# Patient Record
Sex: MALE | Race: WHITE | URBAN - NONMETROPOLITAN AREA
[De-identification: names, ages, dates, MRNs, and addresses within clinical notes are randomized per-mention and may not be internally consistent; named-entity substitution may affect disease eponyms.]

---

## 2018-11-21 ENCOUNTER — HOSPITAL ENCOUNTER (OUTPATIENT)
Age: 43
Setting detail: SPECIMEN
Discharge: HOME | End: 2018-11-21
Payer: COMMERCIAL

## 2018-11-21 ENCOUNTER — OFFICE VISIT (OUTPATIENT)
Dept: UROLOGY | Age: 43
End: 2018-11-21
Payer: COMMERCIAL

## 2018-11-21 VITALS — DIASTOLIC BLOOD PRESSURE: 84 MMHG | SYSTOLIC BLOOD PRESSURE: 134 MMHG | WEIGHT: 230 LBS

## 2018-11-21 DIAGNOSIS — N40.1 BPH WITH OBSTRUCTION/LOWER URINARY TRACT SYMPTOMS: Primary | ICD-10-CM

## 2018-11-21 DIAGNOSIS — N13.8 BPH WITH OBSTRUCTION/LOWER URINARY TRACT SYMPTOMS: ICD-10-CM

## 2018-11-21 DIAGNOSIS — Z30.09 VASECTOMY EVALUATION: ICD-10-CM

## 2018-11-21 DIAGNOSIS — N13.8 BPH WITH OBSTRUCTION/LOWER URINARY TRACT SYMPTOMS: Primary | ICD-10-CM

## 2018-11-21 DIAGNOSIS — N40.1 BPH WITH OBSTRUCTION/LOWER URINARY TRACT SYMPTOMS: ICD-10-CM

## 2018-11-21 LAB
-: ABNORMAL
AMORPHOUS: ABNORMAL
BACTERIA: ABNORMAL
BILIRUBIN URINE: NEGATIVE
CASTS UA: ABNORMAL /LPF
COLOR: YELLOW
COMMENT UA: ABNORMAL
CRYSTALS, UA: ABNORMAL /HPF
EPITHELIAL CELLS UA: ABNORMAL /HPF (ref 0–5)
GLUCOSE URINE: NEGATIVE
KETONES, URINE: NEGATIVE
LEUKOCYTE ESTERASE, URINE: NEGATIVE
MUCUS: ABNORMAL
NITRITE, URINE: NEGATIVE
OTHER OBSERVATIONS UA: ABNORMAL
PH UA: 6 (ref 5–9)
PROTEIN UA: NEGATIVE
RBC UA: ABNORMAL /HPF (ref 0–2)
RENAL EPITHELIAL, UA: ABNORMAL /HPF
SPECIFIC GRAVITY UA: 1.01 (ref 1.01–1.02)
TRICHOMONAS: ABNORMAL
TURBIDITY: CLEAR
URINE HGB: NEGATIVE
UROBILINOGEN, URINE: NORMAL
WBC UA: ABNORMAL /HPF (ref 0–5)
YEAST: ABNORMAL

## 2018-11-21 PROCEDURE — 99204 OFFICE O/P NEW MOD 45 MIN: CPT | Performed by: UROLOGY

## 2018-11-21 PROCEDURE — 87086 URINE CULTURE/COLONY COUNT: CPT

## 2018-11-21 PROCEDURE — 81001 URINALYSIS AUTO W/SCOPE: CPT

## 2018-11-21 RX ORDER — OMEPRAZOLE 40 MG/1
40 CAPSULE, DELAYED RELEASE ORAL DAILY
COMMUNITY
Start: 2018-08-20

## 2018-11-21 RX ORDER — TRAZODONE HYDROCHLORIDE 150 MG/1
150 TABLET ORAL NIGHTLY
COMMUNITY
Start: 2018-11-13

## 2018-11-21 RX ORDER — UBIDECARENONE 75 MG
50 CAPSULE ORAL DAILY
COMMUNITY

## 2018-11-21 RX ORDER — MONTELUKAST SODIUM 10 MG/1
10 TABLET ORAL NIGHTLY
COMMUNITY
Start: 2018-11-13

## 2018-11-21 RX ORDER — M-VIT,TX,IRON,MINS/CALC/FOLIC 27MG-0.4MG
1 TABLET ORAL DAILY
COMMUNITY

## 2018-11-21 RX ORDER — TADALAFIL 5 MG/1
5 TABLET ORAL DAILY
Qty: 30 TABLET | Refills: 11 | Status: SHIPPED | OUTPATIENT
Start: 2018-11-21 | End: 2019-07-29 | Stop reason: SDUPTHER

## 2018-11-21 RX ORDER — ALPRAZOLAM 0.5 MG/1
0.5 TABLET ORAL DAILY
COMMUNITY
Start: 2018-11-14 | End: 2019-11-14

## 2018-11-21 ASSESSMENT — ENCOUNTER SYMPTOMS
SHORTNESS OF BREATH: 0
COLOR CHANGE: 0
EYE PAIN: 0
EYE REDNESS: 0
NAUSEA: 0
BACK PAIN: 0
COUGH: 0
ABDOMINAL PAIN: 0
VOMITING: 0
WHEEZING: 0

## 2018-11-21 NOTE — PROGRESS NOTES
Subjective:      Patient ID: Amie Jolly is a 37 y.o. male. HPI  Patient is a 37 y.o. male in no acute distress and alert and oriented to person, place and time. Patient is here today to discuss vasectomy. Patient here today for vasectomy consultation. We did begin the conversation today with a discussion on the permanence of the procedure. We did discuss the cost and efficacy of vasectomy reversal.  We also discussed a waiting period of 6-8 weeks after the procedure. We advise to not have sexual intercourse during this waiting period. We also discussed the importance of a postvasectomy semen analysis. Patient is also having issues with inability to void. He is having double voiding issues and a hard time emptying his bladder. He reports no pain today. No past medical history on file. No past surgical history on file. Outpatient Encounter Prescriptions as of 11/21/2018   Medication Sig Dispense Refill    omeprazole (PRILOSEC) 40 MG delayed release capsule       Cetirizine HCl (ZYRTEC ALLERGY) 10 MG CAPS       ALPRAZolam (XANAX) 0.5 MG tablet Take 0.5 mg by mouth. .      montelukast (SINGULAIR) 10 MG tablet       traZODone (DESYREL) 150 MG tablet        No facility-administered encounter medications on file as of 11/21/2018. No current outpatient prescriptions on file prior to visit. No current facility-administered medications on file prior to visit. Metoclopramide and Prochlorperazine  No family history on file. History   Smoking Status    Not on file   Smokeless Tobacco    Not on file        History   Alcohol use Not on file       There were no vitals filed for this visit. Constitutional: Patient in no acute distress; Neuro: alert and oriented to person place and time.     Psych: Mood and affect normal.  Skin: Normal  Lungs: Respiratory effort normal  Cardiovascular:  Normal peripheral pulses  Abdomen: Soft, non-tender, non-distended with no CVA, flank pain,

## 2018-11-22 LAB
CULTURE: NO GROWTH
Lab: NORMAL
SPECIMEN DESCRIPTION: NORMAL
STATUS: NORMAL

## 2018-11-26 ENCOUNTER — TELEPHONE (OUTPATIENT)
Dept: UROLOGY | Age: 43
End: 2018-11-26

## 2018-12-28 ENCOUNTER — OFFICE VISIT (OUTPATIENT)
Dept: UROLOGY | Age: 43
End: 2018-12-28
Payer: COMMERCIAL

## 2018-12-28 VITALS
HEIGHT: 77 IN | BODY MASS INDEX: 26.68 KG/M2 | SYSTOLIC BLOOD PRESSURE: 130 MMHG | WEIGHT: 226 LBS | DIASTOLIC BLOOD PRESSURE: 78 MMHG

## 2018-12-28 DIAGNOSIS — R39.13 INTERMITTENT URINARY STREAM: ICD-10-CM

## 2018-12-28 DIAGNOSIS — R39.12 WEAK URINARY STREAM: ICD-10-CM

## 2018-12-28 DIAGNOSIS — Z30.09 VASECTOMY EVALUATION: Primary | ICD-10-CM

## 2018-12-28 DIAGNOSIS — N40.1 BPH WITH OBSTRUCTION/LOWER URINARY TRACT SYMPTOMS: ICD-10-CM

## 2018-12-28 DIAGNOSIS — N13.8 BPH WITH OBSTRUCTION/LOWER URINARY TRACT SYMPTOMS: ICD-10-CM

## 2018-12-28 PROCEDURE — 99213 OFFICE O/P EST LOW 20 MIN: CPT | Performed by: NURSE PRACTITIONER

## 2018-12-28 NOTE — PROGRESS NOTES
History  12/14/2018 vasectomy    Today  Here today for a 2 week follow-up status post vastectomy on 12/14/2018. Pathology does show bilateral complete transverse sections of the vas deferens. Incisions are well approximated without erythema or drainage. There is no testicular pain or swelling. At his last visit we also start him on daily Cialis for symptoms of BPH. He denies a weak stream, hesitancy, intermittency, frequency, urgency, or nocturia. He is very happy with the results of the Cialis. Plan  Patient understands that he is not considered infertile until sperm-free semenalysis 2 months after procedure. We will call him with his semen analysis results. We will have him back in 6 months to reevaluate his symptoms of BPH, but he will call sooner if needed for new or worsening symptoms. If his urinary symptoms remain stable we will remove his follow-up out to annually.

## 2018-12-31 PROBLEM — R39.12 WEAK URINARY STREAM: Status: ACTIVE | Noted: 2018-12-31

## 2018-12-31 PROBLEM — R39.13 INTERMITTENT URINARY STREAM: Status: ACTIVE | Noted: 2018-12-31

## 2018-12-31 PROBLEM — N40.1 BPH WITH OBSTRUCTION/LOWER URINARY TRACT SYMPTOMS: Status: ACTIVE | Noted: 2018-12-31

## 2018-12-31 PROBLEM — N13.8 BPH WITH OBSTRUCTION/LOWER URINARY TRACT SYMPTOMS: Status: ACTIVE | Noted: 2018-12-31

## 2019-02-22 ENCOUNTER — TELEPHONE (OUTPATIENT)
Dept: UROLOGY | Age: 44
End: 2019-02-22

## 2019-02-25 ENCOUNTER — TELEPHONE (OUTPATIENT)
Dept: UROLOGY | Age: 44
End: 2019-02-25

## 2019-03-06 ENCOUNTER — TELEPHONE (OUTPATIENT)
Dept: UROLOGY | Age: 44
End: 2019-03-06

## 2019-07-29 ENCOUNTER — OFFICE VISIT (OUTPATIENT)
Dept: UROLOGY | Age: 44
End: 2019-07-29
Payer: COMMERCIAL

## 2019-07-29 VITALS
BODY MASS INDEX: 27.87 KG/M2 | HEIGHT: 77 IN | SYSTOLIC BLOOD PRESSURE: 140 MMHG | WEIGHT: 236 LBS | DIASTOLIC BLOOD PRESSURE: 90 MMHG

## 2019-07-29 DIAGNOSIS — N13.8 BPH WITH OBSTRUCTION/LOWER URINARY TRACT SYMPTOMS: Primary | ICD-10-CM

## 2019-07-29 DIAGNOSIS — N40.1 BPH WITH OBSTRUCTION/LOWER URINARY TRACT SYMPTOMS: Primary | ICD-10-CM

## 2019-07-29 PROCEDURE — 99213 OFFICE O/P EST LOW 20 MIN: CPT | Performed by: UROLOGY

## 2019-07-29 RX ORDER — TADALAFIL 5 MG/1
5 TABLET ORAL DAILY
Qty: 30 TABLET | Refills: 11 | Status: SHIPPED | OUTPATIENT
Start: 2019-07-29 | End: 2020-07-29 | Stop reason: SDUPTHER

## 2019-07-29 ASSESSMENT — ENCOUNTER SYMPTOMS
COLOR CHANGE: 0
WHEEZING: 0
ABDOMINAL PAIN: 0
VOMITING: 0
EYE REDNESS: 0
BACK PAIN: 0
NAUSEA: 0
EYE PAIN: 0
COUGH: 0
SHORTNESS OF BREATH: 0

## 2020-07-29 ENCOUNTER — OFFICE VISIT (OUTPATIENT)
Dept: UROLOGY | Age: 45
End: 2020-07-29
Payer: COMMERCIAL

## 2020-07-29 VITALS
BODY MASS INDEX: 28.57 KG/M2 | DIASTOLIC BLOOD PRESSURE: 82 MMHG | TEMPERATURE: 98.7 F | WEIGHT: 242 LBS | SYSTOLIC BLOOD PRESSURE: 132 MMHG | HEIGHT: 77 IN

## 2020-07-29 PROCEDURE — 99214 OFFICE O/P EST MOD 30 MIN: CPT | Performed by: UROLOGY

## 2020-07-29 PROCEDURE — 51798 US URINE CAPACITY MEASURE: CPT | Performed by: UROLOGY

## 2020-07-29 RX ORDER — SILDENAFIL CITRATE 20 MG/1
100 TABLET ORAL DAILY PRN
Qty: 40 TABLET | Refills: 5 | Status: SHIPPED | OUTPATIENT
Start: 2020-07-29 | End: 2021-07-28

## 2020-07-29 RX ORDER — ALPRAZOLAM 0.5 MG/1
0.5 TABLET ORAL 3 TIMES DAILY PRN
COMMUNITY
Start: 2020-05-18 | End: 2021-05-18

## 2020-07-29 RX ORDER — NAPROXEN 500 MG/1
TABLET ORAL
COMMUNITY
Start: 2020-06-04 | End: 2022-07-27

## 2020-07-29 RX ORDER — SILDENAFIL CITRATE 20 MG/1
100 TABLET ORAL DAILY PRN
Qty: 40 TABLET | Refills: 5 | Status: SHIPPED | OUTPATIENT
Start: 2020-07-29 | End: 2020-07-29

## 2020-07-29 RX ORDER — MELOXICAM 15 MG/1
TABLET ORAL
COMMUNITY
Start: 2020-07-13

## 2020-07-29 RX ORDER — TADALAFIL 5 MG/1
5 TABLET ORAL DAILY
Qty: 90 TABLET | Refills: 3 | Status: SHIPPED | OUTPATIENT
Start: 2020-07-29 | End: 2021-06-07 | Stop reason: SDUPTHER

## 2020-07-29 RX ORDER — BACLOFEN 20 MG/1
TABLET ORAL
COMMUNITY
Start: 2020-06-30

## 2020-07-29 RX ORDER — HYDROCODONE BITARTRATE AND ACETAMINOPHEN 5; 325 MG/1; MG/1
TABLET ORAL
COMMUNITY
Start: 2020-06-15 | End: 2022-07-27

## 2020-07-29 ASSESSMENT — ENCOUNTER SYMPTOMS
NAUSEA: 0
EYE PAIN: 0
WHEEZING: 0
COLOR CHANGE: 0
ABDOMINAL PAIN: 0
SHORTNESS OF BREATH: 0
VOMITING: 0
BACK PAIN: 0
EYE REDNESS: 0
COUGH: 0

## 2020-07-29 NOTE — PATIENT INSTRUCTIONS
SURVEY:    You may be receiving a survey from [employer] regarding your visit today. Please complete the survey to enable us to provide the highest quality of care to you and your family. If you cannot score us a very good on any question, please call the office to discuss how we could of made your experience a very good one. Thank you.

## 2020-07-29 NOTE — PROGRESS NOTES
HPI:    Patient is a 50 y.o. male in no acute distress. He is alert and oriented to person, place, and time. History  11/2018 Started daily cialis for double voiding and straining with urination    12/2018 vasectomy    No past medical history on file. Past Surgical History:   Procedure Laterality Date    APPENDECTOMY      BACK SURGERY      x 2    HERNIA REPAIR      SHOULDER SURGERY Left     SINUS SURGERY      x 2    VASECTOMY  12/18/2018    [facility] [id number] EXTRACTION       Outpatient Encounter Medications as of 7/29/2020   Medication Sig Dispense Refill    tadalafil (CIALIS) 5 MG tablet Take 1 tablet by mouth daily 30 tablet 11    omeprazole (PRILOSEC) 40 MG delayed release capsule Take 40 mg by mouth daily       Cetirizine HCl (ZYRTEC ALLERGY) 10 MG CAPS Take 10 mg by mouth daily       montelukast (SINGULAIR) 10 MG tablet Take 10 mg by mouth nightly       traZODone (DESYREL) 150 MG tablet Take 150 mg by mouth nightly       Multiple Vitamins-Minerals (THERAPEUTIC MULTIVITAMIN-MINERALS) tablet Take 1 tablet by mouth daily      vitamin B-12 (CYANOCOBALAMIN) 100 MCG tablet Take 50 mcg by mouth daily      Vilazodone HCl (VIIBRYD STARTER PACK) 10 & 20 MG KIT Take 10 mg by mouth daily        No facility-administered encounter medications on file as of 7/29/2020.        Current Outpatient Medications on File Prior to Visit   Medication Sig Dispense Refill    tadalafil (CIALIS) 5 MG tablet Take 1 tablet by mouth daily 30 tablet 11    omeprazole (PRILOSEC) 40 MG delayed release capsule Take 40 mg by mouth daily       Cetirizine HCl (ZYRTEC ALLERGY) 10 MG CAPS Take 10 mg by mouth daily       montelukast (SINGULAIR) 10 MG tablet Take 10 mg by mouth nightly       traZODone (DESYREL) 150 MG tablet Take 150 mg by mouth nightly       Multiple Vitamins-Minerals (THERAPEUTIC MULTIVITAMIN-MINERALS) tablet Take 1 tablet by mouth daily      vitamin B-12 (CYANOCOBALAMIN) 100 MCG tablet Take 50 mcg by mouth daily      Vilazodone HCl (VIIBRYD STARTER PACK) 10 & 20 MG KIT Take 10 mg by mouth daily        No current facility-administered medications on file prior to visit. Prochlorperazine and Metoclopramide  Family History   Problem Relation Age of Onset    Diabetes Father      Social History     Tobacco Use   Smoking Status Former Smoker    Packs/day: 0.25    Years: 0.50    Pack years: 0.12    Types: Cigarettes    Last attempt to quit: 2019    Years since quittin.0   Smokeless Tobacco Never Used       Social History     Substance and Sexual Activity   Alcohol Use Yes    Comment: social       Review of Systems    Temp 98.7 °F (37.1 °C) (Temporal)   Ht 6' 5\" (1.956 m)   Wt 242 lb (109.8 kg)   BMI 28.70 kg/m²       PHYSICAL EXAM:  Constitutional: Patient in no acute distress; Neuro: alert and oriented to person place and time. Psych: Mood and affect normal.  Skin: Normal  Lungs: Respiratory effort normal  Cardiovascular:  Normal peripheral pulses  Abdomen: Soft, non-tender, non-distended with no CVA, flank pain, hepatosplenomegaly or hernia. Kidneys normal.  Bladder non-tender and not distended. Lymphatics: no palpable lymphadenopathy  Penis normal  Urethral meatus normal  Scrotal exam normal  Testicles normal bilaterally  Epididymis normal bilaterally  No evidence of inguinal hernia  Anus and perineum normal  Normal rectal tone with no masses  Prostate soft, non-tender to palpation. No palpable nodules. Estimated 40 grams. Seminal vesicles not palpable.        No results found for: BUN  No results found for: CREATININE  No results found for: PSA    ASSESSMENT:  ***      PLAN:  ***

## 2020-07-29 NOTE — PROGRESS NOTES
HPI:    Patient is a 50 y.o. male in no acute distress. He is alert and oriented to person, place, and time. History  11/2018 Started daily cialis for double voiding and straining with urination     12/2018 vasectomy    Today  Here today for a one-year follow-up for BPH. He is taking daily Cialis. Despite this he does feel he is worsening frequency and urgency. Patient does continue to take daily Cialis. This helps him with his urgency and frequency. He does use Viagra for his erections. He has been using an online service to get his viagra. He does feel that they are more expensive. He has had no recent gross hematuria dysuria. He is able to achieve erections with 100 mg. Patient has no pain today. No past medical history on file. Past Surgical History:   Procedure Laterality Date    APPENDECTOMY      BACK SURGERY      x 2    HERNIA REPAIR      SHOULDER SURGERY Left     SINUS SURGERY      x 2    VASECTOMY  12/18/2018    [facility] - Dr. Muna Zhang    WISDOM TOOTH EXTRACTION       Outpatient Encounter Medications as of 7/29/2020   Medication Sig Dispense Refill    ALPRAZolam (XANAX) 0.5 MG tablet Take 0.5 mg by mouth 3 times daily as needed.       baclofen (LIORESAL) 20 MG tablet       HYDROcodone-acetaminophen (NORCO) 5-325 MG per tablet       meloxicam (MOBIC) 15 MG tablet       naproxen (NAPROSYN) 500 MG tablet       tadalafil (CIALIS) 5 MG tablet Take 1 tablet by mouth daily 30 tablet 11    omeprazole (PRILOSEC) 40 MG delayed release capsule Take 40 mg by mouth daily       Cetirizine HCl (ZYRTEC ALLERGY) 10 MG CAPS Take 10 mg by mouth daily       montelukast (SINGULAIR) 10 MG tablet Take 10 mg by mouth nightly       traZODone (DESYREL) 150 MG tablet Take 150 mg by mouth nightly       Multiple Vitamins-Minerals (THERAPEUTIC MULTIVITAMIN-MINERALS) tablet Take 1 tablet by mouth daily      vitamin B-12 (CYANOCOBALAMIN) 100 MCG tablet Take 50 mcg by mouth daily      Vilazodone HCl (VIIBRYD STARTER PACK) 10 & 20 MG KIT Take 10 mg by mouth daily        No facility-administered encounter medications on file as of 2020. Current Outpatient Medications on File Prior to Visit   Medication Sig Dispense Refill    ALPRAZolam (XANAX) 0.5 MG tablet Take 0.5 mg by mouth 3 times daily as needed.  baclofen (LIORESAL) 20 MG tablet       HYDROcodone-acetaminophen (NORCO) 5-325 MG per tablet       meloxicam (MOBIC) 15 MG tablet       naproxen (NAPROSYN) 500 MG tablet       tadalafil (CIALIS) 5 MG tablet Take 1 tablet by mouth daily 30 tablet 11    omeprazole (PRILOSEC) 40 MG delayed release capsule Take 40 mg by mouth daily       Cetirizine HCl (ZYRTEC ALLERGY) 10 MG CAPS Take 10 mg by mouth daily       montelukast (SINGULAIR) 10 MG tablet Take 10 mg by mouth nightly       traZODone (DESYREL) 150 MG tablet Take 150 mg by mouth nightly       Multiple Vitamins-Minerals (THERAPEUTIC MULTIVITAMIN-MINERALS) tablet Take 1 tablet by mouth daily      vitamin B-12 (CYANOCOBALAMIN) 100 MCG tablet Take 50 mcg by mouth daily      Vilazodone HCl (VIIBRYD STARTER PACK) 10 & 20 MG KIT Take 10 mg by mouth daily        No current facility-administered medications on file prior to visit. Prochlorperazine and Metoclopramide  Family History   Problem Relation Age of Onset    Diabetes Father      Social History     Tobacco Use   Smoking Status Former Smoker    Packs/day: 0.25    Years: 0.50    Pack years: 0.12    Types: Cigarettes    Last attempt to quit: 2019    Years since quittin.0   Smokeless Tobacco Never Used       Social History     Substance and Sexual Activity   Alcohol Use Yes    Comment: social       Review of Systems   Constitutional: Negative for appetite change, chills and fever. Eyes: Negative for pain, redness and visual disturbance. Respiratory: Negative for cough, shortness of breath and wheezing.     Cardiovascular: Negative for chest pain and leg swelling. Gastrointestinal: Negative for abdominal pain, nausea and vomiting. Genitourinary: Negative for difficulty urinating, discharge, dysuria, flank pain, frequency, hematuria, scrotal swelling and testicular pain. Musculoskeletal: Negative for back pain, joint swelling and myalgias. Skin: Negative for color change, rash and wound. Neurological: Negative for dizziness, tremors and numbness. Hematological: Negative for adenopathy. Does not bruise/bleed easily. /82 (Site: Right Upper Arm, Position: Sitting, Cuff Size: Large Adult)   Temp 98.7 °F (37.1 °C) (Temporal)   Ht 6' 5\" (1.956 m)   Wt 242 lb (109.8 kg)   BMI 28.70 kg/m²       PHYSICAL EXAM:  Constitutional: Patient in no acute distress; Neuro: alert and oriented to person place and time. Psych: Mood and affect normal.  Skin: Normal  Lungs: Respiratory effort normal  Cardiovascular:  Normal peripheral pulses  Abdomen: Soft, non-tender, non-distended with no CVA, flank pain, hepatosplenomegaly or hernia. Kidneys normal.  Bladder non-tender and not distended. Lymphatics: no palpable lymphadenopathy  Penis normal  Urethral meatus normal  Scrotal exam normal  Testicles normal bilaterally  Epididymis normal bilaterally  No evidence of inguinal hernia    No results found for: BUN  No results found for: CREATININE  No results found for: PSA    ASSESSMENT:  This is a 50 y.o. male with the following diagnoses:   Diagnosis Orders   1. Frequency of urination  MO MEASUREMENT,POST-VOID RESIDUAL VOLUME BY US,NON-IMAGING   2. BPH with obstruction/lower urinary tract symptoms  MO MEASUREMENT,POST-VOID RESIDUAL VOLUME BY US,NON-IMAGING    tadalafil (CIALIS) 5 MG tablet   3. Urgency of urination  MO MEASUREMENT,POST-VOID RESIDUAL VOLUME BY US,NON-IMAGING   4. Erectile dysfunction, unspecified erectile dysfunction type  sildenafil (REVATIO) 20 MG tablet         PLAN:  We did prescribe patient to [facility] today. He will keep his daily Cialis. We will see him back in 1 year. We did send both of his prescription sent to mail order. He will call us back if these are unaffordable.

## 2021-06-07 ENCOUNTER — TELEPHONE (OUTPATIENT)
Dept: UROLOGY | Age: 46
End: 2021-06-07

## 2021-06-07 DIAGNOSIS — N40.1 BPH WITH OBSTRUCTION/LOWER URINARY TRACT SYMPTOMS: ICD-10-CM

## 2021-06-07 DIAGNOSIS — N13.8 BPH WITH OBSTRUCTION/LOWER URINARY TRACT SYMPTOMS: ICD-10-CM

## 2021-06-07 RX ORDER — TADALAFIL 5 MG/1
5 TABLET ORAL DAILY
Qty: 90 TABLET | Refills: 0 | Status: SHIPPED | OUTPATIENT
Start: 2021-06-07 | End: 2021-07-28 | Stop reason: SDUPTHER

## 2021-06-07 NOTE — TELEPHONE ENCOUNTER
Patient needs a refill of Cialis sent to mail order. He said he does not have enough until his appointment on 7/28.

## 2021-06-07 NOTE — TELEPHONE ENCOUNTER
Patient's chart was reviewed. Patient tolerates Cialis well.   We will give him 1 refill and have him follow-up as scheduled

## 2021-07-28 ENCOUNTER — OFFICE VISIT (OUTPATIENT)
Dept: UROLOGY | Age: 46
End: 2021-07-28
Payer: COMMERCIAL

## 2021-07-28 VITALS
WEIGHT: 226 LBS | DIASTOLIC BLOOD PRESSURE: 81 MMHG | SYSTOLIC BLOOD PRESSURE: 135 MMHG | HEIGHT: 77 IN | HEART RATE: 90 BPM | TEMPERATURE: 99.5 F | BODY MASS INDEX: 26.68 KG/M2

## 2021-07-28 DIAGNOSIS — R39.15 URGENCY OF URINATION: ICD-10-CM

## 2021-07-28 DIAGNOSIS — N52.9 ERECTILE DYSFUNCTION, UNSPECIFIED ERECTILE DYSFUNCTION TYPE: ICD-10-CM

## 2021-07-28 DIAGNOSIS — R35.0 FREQUENCY OF URINATION: Primary | ICD-10-CM

## 2021-07-28 DIAGNOSIS — N40.1 BPH WITH OBSTRUCTION/LOWER URINARY TRACT SYMPTOMS: ICD-10-CM

## 2021-07-28 DIAGNOSIS — N13.8 BPH WITH OBSTRUCTION/LOWER URINARY TRACT SYMPTOMS: ICD-10-CM

## 2021-07-28 PROCEDURE — 99213 OFFICE O/P EST LOW 20 MIN: CPT | Performed by: PHYSICIAN ASSISTANT

## 2021-07-28 RX ORDER — SILDENAFIL 100 MG/1
100 TABLET, FILM COATED ORAL DAILY PRN
Qty: 30 TABLET | Refills: 2 | Status: SHIPPED | OUTPATIENT
Start: 2021-07-28 | End: 2022-07-27

## 2021-07-28 RX ORDER — SILDENAFIL CITRATE 20 MG/1
100 TABLET ORAL DAILY PRN
Qty: 40 TABLET | Refills: 5 | Status: CANCELLED | OUTPATIENT
Start: 2021-07-28

## 2021-07-28 RX ORDER — ALPRAZOLAM 0.5 MG/1
0.5 TABLET ORAL 3 TIMES DAILY PRN
COMMUNITY
Start: 2021-06-23 | End: 2022-06-23

## 2021-07-28 RX ORDER — TIZANIDINE 4 MG/1
TABLET ORAL
COMMUNITY
Start: 2021-07-12

## 2021-07-28 RX ORDER — TADALAFIL 5 MG/1
5 TABLET ORAL DAILY
Qty: 90 TABLET | Refills: 3 | Status: SHIPPED | OUTPATIENT
Start: 2021-07-28 | End: 2022-05-23

## 2021-07-28 RX ORDER — VALACYCLOVIR HYDROCHLORIDE 500 MG/1
TABLET, FILM COATED ORAL
COMMUNITY
Start: 2021-07-20

## 2021-07-28 ASSESSMENT — ENCOUNTER SYMPTOMS
ABDOMINAL PAIN: 0
CONSTIPATION: 0
NAUSEA: 0
VOMITING: 0
BACK PAIN: 0
WHEEZING: 0
SHORTNESS OF BREATH: 0
EYE REDNESS: 0
COLOR CHANGE: 0
COUGH: 0

## 2021-07-28 NOTE — PATIENT INSTRUCTIONS
SURVEY:    You may be receiving a survey from [employer] regarding your visit today. Please complete the survey to enable us to provide the highest quality of care to you and your family. If you cannot score us a very good on any question, please call the office to discuss how we could of made your experience a very good one. Thank you. Schedule a Vaccine  When you qualify to receive the vaccine per the [address] guidelines, call the [facility]) COVID-19 Vaccination Hotline to schedule your appointment or to get additional information about the [facility]) locations which are offering the COVID-19 vaccine. To be most effective, it's important that you receive two doses of one of the COVID-19 vaccines. -If you are receiving the [place] [facility] vaccine, your second shot will be scheduled as close to 21 days after the first shot as possible. -If you are receiving the Moderna vaccine, your second shot will be scheduled as close to 28 days after the first shot as possible. [address] Vaccination Hotline: [phone]    In partnership with [facility] and [facility] HEALTH Departments, patients can call [phone], Monday-Friday 8:00am-4:00pm for scheduling at our Hospitals. Or visit the [facility] websites for additional information of vaccine administration locations. Links to [facility]) website and Health Department websites:    [email or web address]-[email or web address]    [email or web address]    [email or web address]

## 2021-07-28 NOTE — PROGRESS NOTES
HPI:      Patient is a 48 y.o. male in no acute distress. He is alert and oriented to person, place, and time. History  11/2018 Started daily cialis for double voiding and straining with urination     12/2018 vasectomy    Today  Patient is here today for a one-year follow-up for BPH. He is taking daily Cialis, as this does help him with his urgency and frequency. He uses situational sildenafil for erectile dysfunction. He is able to achieve and maintain an erection with 100 mg of sildenafil. He denies any erections lasting more than 4 hours. He denies any fever, chills, gross hematuria, flank pain, dysuria. He denies any chest pain shortness of breath or dyspnea on exertion. He overall is happy. No past medical history on file.   Past Surgical History:   Procedure Laterality Date    APPENDECTOMY      BACK SURGERY      x 2    HERNIA REPAIR      SHOULDER SURGERY Left     SINUS SURGERY      x 2    VASECTOMY  12/18/2018    [facility] - Dr. Ronnie Tao WISDOM TOOTH EXTRACTION       Outpatient Encounter Medications as of 7/28/2021   Medication Sig Dispense Refill    tadalafil (CIALIS) 5 MG tablet Take 1 tablet by mouth daily 90 tablet 0    baclofen (LIORESAL) 20 MG tablet       HYDROcodone-acetaminophen (NORCO) 5-325 MG per tablet       meloxicam (MOBIC) 15 MG tablet       naproxen (NAPROSYN) 500 MG tablet       sildenafil (REVATIO) 20 MG tablet Take 5 tablets by mouth daily as needed (erectile dysfunction) 40 tablet 5    Vilazodone HCl (VIIBRYD STARTER PACK) 10 & 20 MG KIT Take 10 mg by mouth daily       omeprazole (PRILOSEC) 40 MG delayed release capsule Take 40 mg by mouth daily       Cetirizine HCl (ZYRTEC ALLERGY) 10 MG CAPS Take 10 mg by mouth daily       montelukast (SINGULAIR) 10 MG tablet Take 10 mg by mouth nightly       traZODone (DESYREL) 150 MG tablet Take 150 mg by mouth nightly       Multiple Vitamins-Minerals (THERAPEUTIC MULTIVITAMIN-MINERALS) tablet Take 1 tablet by mouth daily      vitamin B-12 (CYANOCOBALAMIN) 100 MCG tablet Take 50 mcg by mouth daily       No facility-administered encounter medications on file as of 2021. Current Outpatient Medications on File Prior to Visit   Medication Sig Dispense Refill    tadalafil (CIALIS) 5 MG tablet Take 1 tablet by mouth daily 90 tablet 0    baclofen (LIORESAL) 20 MG tablet       HYDROcodone-acetaminophen (NORCO) 5-325 MG per tablet       meloxicam (MOBIC) 15 MG tablet       naproxen (NAPROSYN) 500 MG tablet       sildenafil (REVATIO) 20 MG tablet Take 5 tablets by mouth daily as needed (erectile dysfunction) 40 tablet 5    Vilazodone HCl (VIIBRYD STARTER PACK) 10 & 20 MG KIT Take 10 mg by mouth daily       omeprazole (PRILOSEC) 40 MG delayed release capsule Take 40 mg by mouth daily       Cetirizine HCl (ZYRTEC ALLERGY) 10 MG CAPS Take 10 mg by mouth daily       montelukast (SINGULAIR) 10 MG tablet Take 10 mg by mouth nightly       traZODone (DESYREL) 150 MG tablet Take 150 mg by mouth nightly       Multiple Vitamins-Minerals (THERAPEUTIC MULTIVITAMIN-MINERALS) tablet Take 1 tablet by mouth daily      vitamin B-12 (CYANOCOBALAMIN) 100 MCG tablet Take 50 mcg by mouth daily       No current facility-administered medications on file prior to visit. Prochlorperazine and Metoclopramide  Family History   Problem Relation Age of Onset    Diabetes Father      Social History     Tobacco Use   Smoking Status Former Smoker    Packs/day: 0.25    Years: 0.50    Pack years: 0.12    Types: Cigarettes    Quit date: 2019    Years since quittin.0   Smokeless Tobacco Never Used       Social History     Substance and Sexual Activity   Alcohol Use Yes    Comment: social       Review of Systems   Constitutional: Negative for appetite change, chills and fever. Eyes: Negative for redness and visual disturbance. Respiratory: Negative for cough, shortness of breath and wheezing.     Cardiovascular: Negative for chest pain and leg swelling. Gastrointestinal: Negative for abdominal pain, constipation, nausea and vomiting. Genitourinary: Negative for decreased urine volume, difficulty urinating, discharge, dysuria, enuresis, flank pain, frequency, hematuria, penile pain, scrotal swelling, testicular pain and urgency. Musculoskeletal: Negative for back pain, joint swelling and myalgias. Skin: Negative for color change, rash and wound. Neurological: Negative for dizziness, tremors and numbness. Hematological: Negative for adenopathy. Does not bruise/bleed easily. Temp 99.5 °F (37.5 °C) (Temporal)   Ht 6' 5\" (1.956 m)   Wt 226 lb (102.5 kg)   BMI 26.80 kg/m²       PHYSICAL EXAM:  Constitutional: Patient in no acute distress; Neuro: alert and oriented to person place and time. Psych: Mood and affect normal.  Lungs: Respiratory effort normal  Abdomen: Soft, non-tender, non-distended  Rectal: Deferred      No results found for: BUN  No results found for: CREATININE  No results found for: PSA    ASSESSMENT:   Diagnosis Orders   1. Frequency of urination  tadalafil (CIALIS) 5 MG tablet   2. BPH with obstruction/lower urinary tract symptoms  tadalafil (CIALIS) 5 MG tablet   3. Erectile dysfunction, unspecified erectile dysfunction type     4. Urgency of urination  tadalafil (CIALIS) 5 MG tablet         PLAN:  Continue sildenafil 100 mg as needed for erectile dysfunction    Continue daily tadalafil 5 mg for his urgency and frequency symptoms. Follow-up in 1 year    Patient is aware if he develops an erection lasting more than 4 hours he needs go the emergency room.

## 2022-05-22 DIAGNOSIS — R39.15 URGENCY OF URINATION: ICD-10-CM

## 2022-05-22 DIAGNOSIS — N13.8 BPH WITH OBSTRUCTION/LOWER URINARY TRACT SYMPTOMS: ICD-10-CM

## 2022-05-22 DIAGNOSIS — R35.0 FREQUENCY OF URINATION: ICD-10-CM

## 2022-05-22 DIAGNOSIS — N40.1 BPH WITH OBSTRUCTION/LOWER URINARY TRACT SYMPTOMS: ICD-10-CM

## 2022-05-23 RX ORDER — TADALAFIL 5 MG/1
5 TABLET ORAL DAILY
Qty: 90 TABLET | Refills: 0 | Status: SHIPPED | OUTPATIENT
Start: 2022-05-23 | End: 2022-07-27 | Stop reason: SDUPTHER

## 2022-07-27 ENCOUNTER — OFFICE VISIT (OUTPATIENT)
Dept: UROLOGY | Age: 47
End: 2022-07-27
Payer: COMMERCIAL

## 2022-07-27 VITALS
WEIGHT: 242 LBS | SYSTOLIC BLOOD PRESSURE: 144 MMHG | HEART RATE: 99 BPM | DIASTOLIC BLOOD PRESSURE: 91 MMHG | BODY MASS INDEX: 28.7 KG/M2

## 2022-07-27 DIAGNOSIS — R39.15 URGENCY OF URINATION: ICD-10-CM

## 2022-07-27 DIAGNOSIS — R35.0 FREQUENCY OF URINATION: ICD-10-CM

## 2022-07-27 DIAGNOSIS — N13.8 BPH WITH OBSTRUCTION/LOWER URINARY TRACT SYMPTOMS: ICD-10-CM

## 2022-07-27 DIAGNOSIS — N52.9 ERECTILE DYSFUNCTION, UNSPECIFIED ERECTILE DYSFUNCTION TYPE: Primary | ICD-10-CM

## 2022-07-27 DIAGNOSIS — N40.1 BPH WITH OBSTRUCTION/LOWER URINARY TRACT SYMPTOMS: ICD-10-CM

## 2022-07-27 PROCEDURE — 99214 OFFICE O/P EST MOD 30 MIN: CPT | Performed by: PHYSICIAN ASSISTANT

## 2022-07-27 RX ORDER — TADALAFIL 20 MG/1
20 TABLET ORAL DAILY PRN
Qty: 30 TABLET | Refills: 2 | Status: SHIPPED | OUTPATIENT
Start: 2022-07-27

## 2022-07-27 RX ORDER — OXYCODONE HYDROCHLORIDE 5 MG/1
TABLET ORAL
COMMUNITY
Start: 2022-07-20

## 2022-07-27 RX ORDER — ALPRAZOLAM 0.5 MG/1
TABLET ORAL
COMMUNITY
Start: 2022-07-13

## 2022-07-27 RX ORDER — TADALAFIL 5 MG/1
5 TABLET ORAL DAILY
Qty: 90 TABLET | Refills: 3 | Status: SHIPPED | OUTPATIENT
Start: 2022-07-27 | End: 2023-07-22

## 2022-07-27 RX ORDER — HYDROMORPHONE HYDROCHLORIDE 4 MG/1
4 TABLET ORAL EVERY 6 HOURS PRN
COMMUNITY

## 2022-07-27 ASSESSMENT — ENCOUNTER SYMPTOMS
BACK PAIN: 0
EYE REDNESS: 0
CONSTIPATION: 0
WHEEZING: 0
SHORTNESS OF BREATH: 0
COUGH: 0
COLOR CHANGE: 0
VOMITING: 0
NAUSEA: 0
ABDOMINAL PAIN: 0

## 2022-07-27 NOTE — PROGRESS NOTES
HPI:      Patient is a 50 y.o. male in no acute distress. He is alert and oriented to person, place, and time. History  11/2018 Started daily cialis for double voiding and straining with urination     12/2018 vasectomy    Today  Patient is here today for a one-year follow-up for BPH. He is taking daily Cialis, as this does help him with his urgency and frequency. He uses situational sildenafil for erectile dysfunction. He is able to achieve and maintain an erection with 100 mg of sildenafil. He denies any erections lasting more than 4 hours. He states that on a few occasions he did not have the sildenafil available so he did take 20 mg of Cialis and situational dose. He states he had good results with this. He denies any fever, chills, gross hematuria, flank pain, dysuria. He denies any chest pain shortness of breath or dyspnea on exertion. not on any nitrates. He overall is happy. No past medical history on file. Past Surgical History:   Procedure Laterality Date    APPENDECTOMY      BACK SURGERY      x 2    HERNIA REPAIR      SHOULDER SURGERY Left     SINUS SURGERY      x 2    VASECTOMY  12/18/2018    [facility] - Dr. Josee Chu    WISDOM TOOTH EXTRACTION       Outpatient Encounter Medications as of 7/27/2022   Medication Sig Dispense Refill    ALPRAZolam (XANAX) 0.5 MG tablet       HYDROmorphone (DILAUDID) 4 MG tablet Take 4 mg by mouth every 6 hours as needed for Pain.      diazePAM (VALIUM PO) Take by mouth      tadalafil (CIALIS) 5 MG tablet Take 1 tablet by mouth in the morning.  90 tablet 3    tadalafil (CIALIS) 20 MG tablet Take 1 tablet by mouth daily as needed for Erectile Dysfunction (DO NOT TAKE EVERY DAY) 30 tablet 2    tiZANidine (ZANAFLEX) 4 MG tablet       valACYclovir (VALTREX) 500 MG tablet       baclofen (LIORESAL) 20 MG tablet       meloxicam (MOBIC) 15 MG tablet       omeprazole (PRILOSEC) 40 MG delayed release capsule Take 40 mg by mouth daily       Cetirizine HCl 10 by mouth daily      oxyCODONE (ROXICODONE) 5 MG immediate release tablet        No current facility-administered medications on file prior to visit. Prochlorperazine and Metoclopramide  Family History   Problem Relation Age of Onset    Diabetes Father      Social History     Tobacco Use   Smoking Status Former    Packs/day: 0.25    Years: 0.50    Pack years: 0.13    Types: Cigarettes    Quit date: 7/1/2019    Years since quitting: 3.0   Smokeless Tobacco Never       Social History     Substance and Sexual Activity   Alcohol Use Yes    Comment: social       Review of Systems   Constitutional:  Negative for appetite change, chills and fever. Eyes:  Negative for redness and visual disturbance. Respiratory:  Negative for cough, shortness of breath and wheezing. Cardiovascular:  Negative for chest pain and leg swelling. Gastrointestinal:  Negative for abdominal pain, constipation, nausea and vomiting. Genitourinary:  Negative for decreased urine volume, difficulty urinating, dysuria, enuresis, flank pain, frequency, hematuria, penile discharge, penile pain, scrotal swelling, testicular pain and urgency. Positive for erectile dysfunction   Musculoskeletal:  Negative for back pain, joint swelling and myalgias. Skin:  Negative for color change, rash and wound. Neurological:  Negative for dizziness, tremors and numbness. Hematological:  Negative for adenopathy. Does not bruise/bleed easily. BP (!) 144/91 (Site: Left Upper Arm, Position: Sitting, Cuff Size: Large Adult)   Pulse 99   Wt 242 lb (109.8 kg)   BMI 28.70 kg/m²       PHYSICAL EXAM:  Constitutional: Patient in no acute distress; Neuro: alert and oriented to person place and time.     Psych: Mood and affect normal.  Lungs: Respiratory effort normal  Abdomen: Soft, non-tender, non-distended  Rectal: deferred       No results found for: BUN  No results found for: CREATININE  No results found for: PSA    ASSESSMENT:   Diagnosis Orders 1. Erectile dysfunction, unspecified erectile dysfunction type        2. BPH with obstruction/lower urinary tract symptoms  tadalafil (CIALIS) 5 MG tablet      3. Frequency of urination  tadalafil (CIALIS) 5 MG tablet      4. Urgency of urination  tadalafil (CIALIS) 5 MG tablet            PLAN:  Continue daily Cialis for BPH symptoms    Stop sildenafil    Start situational Cialis, he is aware that this is off label use of this medication    Go to emergency room if having erections lasting more than 4 hours.     Follow-up in 1 year

## 2023-07-01 DIAGNOSIS — R35.0 FREQUENCY OF URINATION: ICD-10-CM

## 2023-07-01 DIAGNOSIS — N40.1 BPH WITH OBSTRUCTION/LOWER URINARY TRACT SYMPTOMS: ICD-10-CM

## 2023-07-01 DIAGNOSIS — R39.15 URGENCY OF URINATION: ICD-10-CM

## 2023-07-01 DIAGNOSIS — N13.8 BPH WITH OBSTRUCTION/LOWER URINARY TRACT SYMPTOMS: ICD-10-CM

## 2023-07-05 RX ORDER — TADALAFIL 5 MG/1
5 TABLET ORAL DAILY
Qty: 90 TABLET | Refills: 0 | Status: SHIPPED | OUTPATIENT
Start: 2023-07-05 | End: 2023-07-24 | Stop reason: SDUPTHER

## 2023-07-24 ENCOUNTER — OFFICE VISIT (OUTPATIENT)
Dept: UROLOGY | Age: 48
End: 2023-07-24

## 2023-07-24 VITALS
HEIGHT: 77 IN | SYSTOLIC BLOOD PRESSURE: 125 MMHG | HEART RATE: 99 BPM | DIASTOLIC BLOOD PRESSURE: 77 MMHG | BODY MASS INDEX: 29.99 KG/M2 | WEIGHT: 254 LBS | TEMPERATURE: 97.8 F

## 2023-07-24 DIAGNOSIS — N13.8 BPH WITH OBSTRUCTION/LOWER URINARY TRACT SYMPTOMS: Primary | ICD-10-CM

## 2023-07-24 DIAGNOSIS — R39.15 URGENCY OF URINATION: ICD-10-CM

## 2023-07-24 DIAGNOSIS — N52.9 ERECTILE DYSFUNCTION, UNSPECIFIED ERECTILE DYSFUNCTION TYPE: ICD-10-CM

## 2023-07-24 DIAGNOSIS — N40.1 BPH WITH OBSTRUCTION/LOWER URINARY TRACT SYMPTOMS: Primary | ICD-10-CM

## 2023-07-24 DIAGNOSIS — Z12.5 SCREENING PSA (PROSTATE SPECIFIC ANTIGEN): ICD-10-CM

## 2023-07-24 DIAGNOSIS — R35.0 FREQUENCY OF URINATION: ICD-10-CM

## 2023-07-24 PROCEDURE — 99213 OFFICE O/P EST LOW 20 MIN: CPT | Performed by: NURSE PRACTITIONER

## 2023-07-24 RX ORDER — SILDENAFIL 100 MG/1
100 TABLET, FILM COATED ORAL DAILY PRN
Qty: 90 TABLET | Refills: 1 | Status: SHIPPED | OUTPATIENT
Start: 2023-07-24

## 2023-07-24 RX ORDER — MORPHINE SULFATE 30 MG/1
30 TABLET, FILM COATED, EXTENDED RELEASE ORAL 2 TIMES DAILY
COMMUNITY

## 2023-07-24 RX ORDER — TADALAFIL 5 MG/1
5 TABLET ORAL DAILY
Qty: 90 TABLET | Refills: 3 | Status: SHIPPED | OUTPATIENT
Start: 2023-07-24 | End: 2024-07-18

## 2023-07-24 ASSESSMENT — ENCOUNTER SYMPTOMS
SHORTNESS OF BREATH: 0
CONSTIPATION: 0
NAUSEA: 0
VOMITING: 0
BACK PAIN: 0
COUGH: 0
ABDOMINAL PAIN: 0
EYE REDNESS: 0
WHEEZING: 0
COLOR CHANGE: 0

## 2023-07-24 NOTE — PATIENT INSTRUCTIONS
SURVEY:    You may be receiving a survey from [email or web address] regarding your visit today. Please complete the survey to enable us to provide the highest quality of care to you and your family. If you cannot score us a very good on any question, please call the office to discuss how we could of made your experience a very good one. Thank you.

## 2023-09-20 DIAGNOSIS — N40.1 BPH WITH OBSTRUCTION/LOWER URINARY TRACT SYMPTOMS: ICD-10-CM

## 2023-09-20 DIAGNOSIS — N13.8 BPH WITH OBSTRUCTION/LOWER URINARY TRACT SYMPTOMS: ICD-10-CM

## 2023-09-20 DIAGNOSIS — R35.0 FREQUENCY OF URINATION: ICD-10-CM

## 2023-09-20 DIAGNOSIS — R39.15 URGENCY OF URINATION: ICD-10-CM

## 2023-09-21 RX ORDER — TADALAFIL 5 MG/1
5 TABLET ORAL EVERY MORNING
Qty: 90 TABLET | Refills: 3 | Status: SHIPPED | OUTPATIENT
Start: 2023-09-21

## 2024-04-02 DIAGNOSIS — R39.15 URGENCY OF URINATION: ICD-10-CM

## 2024-04-02 DIAGNOSIS — R35.0 FREQUENCY OF URINATION: ICD-10-CM

## 2024-04-02 DIAGNOSIS — N52.9 ERECTILE DYSFUNCTION, UNSPECIFIED ERECTILE DYSFUNCTION TYPE: ICD-10-CM

## 2024-04-02 DIAGNOSIS — N13.8 BPH WITH OBSTRUCTION/LOWER URINARY TRACT SYMPTOMS: ICD-10-CM

## 2024-04-02 DIAGNOSIS — N40.1 BPH WITH OBSTRUCTION/LOWER URINARY TRACT SYMPTOMS: ICD-10-CM

## 2024-04-02 RX ORDER — TADALAFIL 5 MG/1
5 TABLET ORAL EVERY MORNING
Qty: 90 TABLET | Refills: 3 | Status: SHIPPED | OUTPATIENT
Start: 2024-04-02

## 2024-04-02 RX ORDER — SILDENAFIL 100 MG/1
100 TABLET, FILM COATED ORAL DAILY PRN
Qty: 90 TABLET | Refills: 1 | Status: SHIPPED | OUTPATIENT
Start: 2024-04-02

## 2024-04-02 NOTE — TELEPHONE ENCOUNTER
Patient called office stating that [facility] canceled his medication orders because they didn't think he needed to take both medications. Patient states he told pharmacist that he is prescribed both medications for different reasons but pharmacist had already canceled the orders so he stated that he needed new refills sent. Please advise.

## 2024-04-03 ENCOUNTER — TELEPHONE (OUTPATIENT)
Dept: UROLOGY | Age: 49
End: 2024-04-03

## 2024-04-24 ENCOUNTER — OFFICE VISIT (OUTPATIENT)
Dept: UROLOGY | Age: 49
End: 2024-04-24
Payer: COMMERCIAL

## 2024-04-24 VITALS
BODY MASS INDEX: 30.12 KG/M2 | WEIGHT: 254 LBS | HEART RATE: 111 BPM | TEMPERATURE: 98.7 F | SYSTOLIC BLOOD PRESSURE: 153 MMHG | DIASTOLIC BLOOD PRESSURE: 107 MMHG

## 2024-04-24 DIAGNOSIS — R39.15 URGENCY OF URINATION: ICD-10-CM

## 2024-04-24 DIAGNOSIS — R35.0 FREQUENCY OF URINATION: ICD-10-CM

## 2024-04-24 DIAGNOSIS — N13.8 BPH WITH OBSTRUCTION/LOWER URINARY TRACT SYMPTOMS: Primary | ICD-10-CM

## 2024-04-24 DIAGNOSIS — N40.1 BPH WITH OBSTRUCTION/LOWER URINARY TRACT SYMPTOMS: Primary | ICD-10-CM

## 2024-04-24 DIAGNOSIS — N52.9 ERECTILE DYSFUNCTION, UNSPECIFIED ERECTILE DYSFUNCTION TYPE: ICD-10-CM

## 2024-04-24 PROCEDURE — 99214 OFFICE O/P EST MOD 30 MIN: CPT | Performed by: PHYSICIAN ASSISTANT

## 2024-04-24 PROCEDURE — 51798 US URINE CAPACITY MEASURE: CPT | Performed by: PHYSICIAN ASSISTANT

## 2024-04-24 RX ORDER — TADALAFIL 20 MG/1
20 TABLET ORAL DAILY PRN
Qty: 90 TABLET | Refills: 1 | Status: SHIPPED | OUTPATIENT
Start: 2024-04-24

## 2024-04-24 RX ORDER — TADALAFIL 5 MG/1
5 TABLET ORAL EVERY MORNING
Qty: 90 TABLET | Refills: 3 | Status: SHIPPED | OUTPATIENT
Start: 2024-04-24

## 2024-04-24 ASSESSMENT — ENCOUNTER SYMPTOMS
BACK PAIN: 1
SHORTNESS OF BREATH: 0
COLOR CHANGE: 0
CONSTIPATION: 0
COUGH: 0
EYE REDNESS: 0
WHEEZING: 0
VOMITING: 0
ABDOMINAL PAIN: 0
NAUSEA: 0

## 2024-04-24 NOTE — PROGRESS NOTES
[place] performed in office today:  Pt voided - 50 mL, PVR - 0 mL   
      No results found for: \"BUN\"  No results found for: \"CREATININE\"  No results found for: \"PSA\"    ASSESSMENT:   Diagnosis Orders   1. BPH with obstruction/lower urinary tract symptoms  SC ADENIKE POST-VOIDING RESIDUAL URINE&/BLADDER CAP    tadalafil (CIALIS) 5 MG tablet      2. Frequency of urination  tadalafil (CIALIS) 5 MG tablet      3. Urgency of urination  tadalafil (CIALIS) 5 MG tablet      4. Erectile dysfunction, unspecified erectile dysfunction type              PLAN:  Discussed with him that situational Cialis and situational Viagra should not be used even if they are cycled.    Continue daily Cialis for BPH and erectile dysfunction    Patient would like to switch to situational Cialis, he is aware that this is off label usage    Discontinue situational Viagra    Follow-up in 1 year

## 2024-12-11 NOTE — TELEPHONE ENCOUNTER
Patient called and the Cialis 20mg should have been sent to [facility] not the 5mg.  The 5mg he gets from the mail order and has that. He flip flops which one he takes.  He will take the Cialis 20mg for a couple months, then switches to Sildenafil for a month.   
Patient needs to come in to discuss how he is taking these medications.  Please make an appointment to speak with Ila    Per last office note:  \"Continue daily Cialis for BPH     Stop situational Cialis     Start sildenafil 100 mg as needed for erectile dysfunction.  He does understand to take this on an empty stomach.  He does understand that if he has an erection lasting more than 4 hours to go to the ER immediately\"  
Talk to patient advised him of response. Patient verbalized understanding and wishes to see  only agreed to appt. 4/12/24 at 8am.  
noted  
70.3
left normal/right normal

## 2025-02-12 ENCOUNTER — TELEPHONE (OUTPATIENT)
Dept: UROLOGY | Age: 50
End: 2025-02-12

## 2025-02-12 NOTE — TELEPHONE ENCOUNTER
Patient called saying he needed a refill of Tadalafil 5mg because he was almost out.  I called [facility] and they sent 90 pills on 1/31.  I left message for patient to let him know this and said we would refill it at his next appointment.

## 2025-03-26 DIAGNOSIS — N13.8 BPH WITH OBSTRUCTION/LOWER URINARY TRACT SYMPTOMS: ICD-10-CM

## 2025-03-26 DIAGNOSIS — R39.15 URGENCY OF URINATION: ICD-10-CM

## 2025-03-26 DIAGNOSIS — N40.1 BPH WITH OBSTRUCTION/LOWER URINARY TRACT SYMPTOMS: ICD-10-CM

## 2025-03-26 DIAGNOSIS — R35.0 FREQUENCY OF URINATION: ICD-10-CM

## 2025-03-27 RX ORDER — TADALAFIL 5 MG/1
5 TABLET ORAL EVERY MORNING
Qty: 90 TABLET | Refills: 0 | Status: SHIPPED | OUTPATIENT
Start: 2025-03-27

## 2025-03-27 NOTE — TELEPHONE ENCOUNTER
Last appt   4/24/2024   Next appt   4/30/2025     Medication is active on current medication list.

## 2025-04-30 ENCOUNTER — OFFICE VISIT (OUTPATIENT)
Dept: UROLOGY | Age: 50
End: 2025-04-30
Payer: COMMERCIAL

## 2025-04-30 VITALS
SYSTOLIC BLOOD PRESSURE: 143 MMHG | TEMPERATURE: 100 F | HEART RATE: 103 BPM | BODY MASS INDEX: 32.59 KG/M2 | WEIGHT: 276 LBS | DIASTOLIC BLOOD PRESSURE: 96 MMHG | HEIGHT: 77 IN

## 2025-04-30 DIAGNOSIS — N13.8 BPH WITH OBSTRUCTION/LOWER URINARY TRACT SYMPTOMS: ICD-10-CM

## 2025-04-30 DIAGNOSIS — R39.15 URGENCY OF URINATION: ICD-10-CM

## 2025-04-30 DIAGNOSIS — R35.0 FREQUENCY OF URINATION: ICD-10-CM

## 2025-04-30 DIAGNOSIS — N40.1 BPH WITH OBSTRUCTION/LOWER URINARY TRACT SYMPTOMS: ICD-10-CM

## 2025-04-30 DIAGNOSIS — N52.9 ERECTILE DYSFUNCTION, UNSPECIFIED ERECTILE DYSFUNCTION TYPE: Primary | ICD-10-CM

## 2025-04-30 PROCEDURE — 99214 OFFICE O/P EST MOD 30 MIN: CPT | Performed by: PHYSICIAN ASSISTANT

## 2025-04-30 RX ORDER — TADALAFIL 5 MG/1
5 TABLET ORAL EVERY MORNING
Qty: 90 TABLET | Refills: 3 | Status: SHIPPED | OUTPATIENT
Start: 2025-04-30

## 2025-04-30 RX ORDER — TADALAFIL 20 MG/1
TABLET ORAL
Qty: 90 TABLET | Refills: 1 | Status: SHIPPED | OUTPATIENT
Start: 2025-04-30

## 2025-04-30 NOTE — PROGRESS NOTES
HPI:      Patient is a 49 y.o. male in no acute distress.  He is alert and oriented to person, place, and time.       History  11/2018 Started daily cialis for double voiding and straining with urination     12/2018 vasectomy    7/2022 on daily cialis and sildenafil 100mg as needed for ED changed to cialis 20mg per patient request      Today  Patient is here today for follow-up erectile function.  He does take daily Cialis.  He feels that this helped.  He does take situational Cialis 20 mg.  He occasionally takes 2 of these.  He is able to achieve and maintain erection.  He is on antidepressant medication he is aware that this can affect his erection.  Patient is on trazodone at night and chronic narcotics for chronic pain.  He is aware that this is a factor in his erection issues.    No past medical history on file.  Past Surgical History:   Procedure Laterality Date    APPENDECTOMY      BACK SURGERY      x 2    HERNIA REPAIR      SHOULDER SURGERY Left     SINUS SURGERY      x 2    VASECTOMY  12/18/2018    [facility] - Dr. Baig    WISDOM TOOTH EXTRACTION       Outpatient Encounter Medications as of 4/30/2025   Medication Sig Dispense Refill    tadalafil (CIALIS) 5 MG tablet Take 1 tablet by mouth every morning 90 tablet 3    tadalafil (CIALIS) 20 MG tablet Take 1 tablet po as needed ED, do not take any more frequently than every 72 hours, DO NOT TAKE MORE THAN PRESCRIBED 90 tablet 1    tiZANidine (ZANAFLEX) 4 MG tablet       valACYclovir (VALTREX) 500 MG tablet       meloxicam (MOBIC) 15 MG tablet       omeprazole (PRILOSEC) 40 MG delayed release capsule Take 1 capsule by mouth daily      Cetirizine HCl 10 MG CAPS Take 10 mg by mouth daily       montelukast (SINGULAIR) 10 MG tablet Take 1 tablet by mouth nightly      traZODone (DESYREL) 150 MG tablet Take 1 tablet by mouth nightly      Multiple Vitamins-Minerals (THERAPEUTIC MULTIVITAMIN-MINERALS) tablet Take 1 tablet by mouth daily      vitamin B-12